# Patient Record
Sex: FEMALE | URBAN - METROPOLITAN AREA
[De-identification: names, ages, dates, MRNs, and addresses within clinical notes are randomized per-mention and may not be internally consistent; named-entity substitution may affect disease eponyms.]

---

## 2023-02-20 ENCOUNTER — OFFICE VISIT (OUTPATIENT)
Dept: URBAN - METROPOLITAN AREA CLINIC 24 | Facility: CLINIC | Age: 83
End: 2023-02-20
Payer: MEDICARE

## 2023-02-20 DIAGNOSIS — H26.493 OTHER SECONDARY CATARACT, BILATERAL: ICD-10-CM

## 2023-02-20 DIAGNOSIS — H40.013 OPEN ANGLE WITH BORDERLINE FINDINGS, LOW RISK, BILATERAL: Primary | ICD-10-CM

## 2023-02-20 PROCEDURE — 92133 CPTRZD OPH DX IMG PST SGM ON: CPT | Performed by: STUDENT IN AN ORGANIZED HEALTH CARE EDUCATION/TRAINING PROGRAM

## 2023-02-20 PROCEDURE — 99203 OFFICE O/P NEW LOW 30 MIN: CPT | Performed by: STUDENT IN AN ORGANIZED HEALTH CARE EDUCATION/TRAINING PROGRAM

## 2023-02-20 ASSESSMENT — KERATOMETRY
OD: 44.33
OS: 43.81

## 2023-02-20 ASSESSMENT — INTRAOCULAR PRESSURE
OS: 10
OD: 11

## 2023-02-20 NOTE — IMPRESSION/PLAN
Impression: Open angle with borderline findings, low risk, bilateral: H40.013.
-- 2' asymmetric c/d
-- ddx physiological cupping, however despite c/d asym on fundus exam OCT RNFL mapping with ~symmetric large c/d
-- prev consult with Dr Catina Eng x 2014
-- Pach 518 / [phone] Plan: IOP today reasonable & low with thin-avg pach
c/d 0.35 / 0.6-- essentially stable asymmetry OCT RNFL x 2/20/23 OD 91um / DA 2.27 /  OS 93um / DA 2.16 /  Pt ed condition, findings and risk for glaucoma which can cause irreversible vision loss. Discussed importance of f/u care. No treatment necessary at this time.  RTC for baseline photos / visual field